# Patient Record
Sex: FEMALE | Race: OTHER | HISPANIC OR LATINO | URBAN - METROPOLITAN AREA
[De-identification: names, ages, dates, MRNs, and addresses within clinical notes are randomized per-mention and may not be internally consistent; named-entity substitution may affect disease eponyms.]

---

## 2017-08-14 ENCOUNTER — EMERGENCY (EMERGENCY)
Facility: HOSPITAL | Age: 32
LOS: 1 days | Discharge: HOME | End: 2017-08-14
Attending: EMERGENCY MEDICINE | Admitting: EMERGENCY MEDICINE
Payer: SELF-PAY

## 2017-08-14 VITALS
RESPIRATION RATE: 16 BRPM | HEART RATE: 71 BPM | TEMPERATURE: 98 F | OXYGEN SATURATION: 99 % | DIASTOLIC BLOOD PRESSURE: 60 MMHG | SYSTOLIC BLOOD PRESSURE: 100 MMHG

## 2017-08-14 VITALS
HEART RATE: 72 BPM | SYSTOLIC BLOOD PRESSURE: 115 MMHG | RESPIRATION RATE: 18 BRPM | TEMPERATURE: 99 F | OXYGEN SATURATION: 99 % | DIASTOLIC BLOOD PRESSURE: 65 MMHG

## 2017-08-14 VITALS
RESPIRATION RATE: 22 BRPM | TEMPERATURE: 99 F | SYSTOLIC BLOOD PRESSURE: 103 MMHG | HEART RATE: 103 BPM | DIASTOLIC BLOOD PRESSURE: 67 MMHG

## 2017-08-14 DIAGNOSIS — Z76.5 MALINGERER [CONSCIOUS SIMULATION]: ICD-10-CM

## 2017-08-14 DIAGNOSIS — F20.9 SCHIZOPHRENIA, UNSPECIFIED: ICD-10-CM

## 2017-08-14 DIAGNOSIS — R10.9 UNSPECIFIED ABDOMINAL PAIN: ICD-10-CM

## 2017-08-14 DIAGNOSIS — F17.200 NICOTINE DEPENDENCE, UNSPECIFIED, UNCOMPLICATED: ICD-10-CM

## 2017-08-14 DIAGNOSIS — R69 ILLNESS, UNSPECIFIED: ICD-10-CM

## 2017-08-14 DIAGNOSIS — F19.94 OTHER PSYCHOACTIVE SUBSTANCE USE, UNSPECIFIED WITH PSYCHOACTIVE SUBSTANCE-INDUCED MOOD DISORDER: ICD-10-CM

## 2017-08-14 LAB
APPEARANCE UR: CLEAR — SIGNIFICANT CHANGE UP
BILIRUB UR-MCNC: (no result)
COLOR SPEC: YELLOW — SIGNIFICANT CHANGE UP
DIFF PNL FLD: (no result)
GLUCOSE UR QL: NEGATIVE — SIGNIFICANT CHANGE UP
HCG UR QL: NEGATIVE — SIGNIFICANT CHANGE UP
KETONES UR-MCNC: 15 MG/DL
LEUKOCYTE ESTERASE UR-ACNC: (no result)
NITRITE UR-MCNC: NEGATIVE — SIGNIFICANT CHANGE UP
PCP SPEC-MCNC: SIGNIFICANT CHANGE UP
PH UR: 5.5 — SIGNIFICANT CHANGE UP (ref 5–8)
PROT UR-MCNC: NEGATIVE MG/DL — SIGNIFICANT CHANGE UP
SP GR SPEC: >=1.03 — SIGNIFICANT CHANGE UP (ref 1–1.03)
UROBILINOGEN FLD QL: 0.2 E.U./DL — SIGNIFICANT CHANGE UP

## 2017-08-14 PROCEDURE — 99053 MED SERV 10PM-8AM 24 HR FAC: CPT

## 2017-08-14 PROCEDURE — 99283 EMERGENCY DEPT VISIT LOW MDM: CPT | Mod: 25

## 2017-08-14 PROCEDURE — 99220: CPT

## 2017-08-14 PROCEDURE — 90792 PSYCH DIAG EVAL W/MED SRVCS: CPT | Mod: GT

## 2017-08-14 NOTE — ED PROVIDER NOTE - OBJECTIVE STATEMENT
31 F reports foot pain at triage, when I attempt to take history from patient she is covered with sheet sleeping in chair, then begins yelling at me and cursing at me when I try to remove sheet, she states she will not let me examen her and tells me to give her "the fucking discharge papers." Noted to be ambulating without difficulty

## 2017-08-14 NOTE — ED CDU PROVIDER NOTE - PROGRESS NOTE DETAILS
discussed with telepsych who will eval patient pt uncooperative with psych - no suicidal plan and cleared by psych, and suspected to be malingering, stable for dc home

## 2017-08-14 NOTE — ED BEHAVIORAL HEALTH ASSESSMENT NOTE - SUMMARY
Patient is a 30 y/o single, undomiciled, unemployed female with h/o untreated schizophrenia, bib self for abdominal pain, refused blood work, gave urine - positive for THC and Cocaine, slept when she came in.  She was irritable during interview, guarded and did not want to provide me with any information.  She did not appear psychotic and did not respond to internal stimuli during the interview.  She refused to tell me what happened she came from [place] 14 years ago.  She denies she has any family.  She denied drug use and said she occasionally drinks etoh.  She said she is sometimes suicidal but has no plan or intent.  She said she had an attempt 14 years ago by taking pills but would not give me any information.  She denies ever taking any medication for psychosis.  It is unclear if this is drug induced psychosis.  She then refused to give me any more information.

## 2017-08-14 NOTE — ED BEHAVIORAL HEALTH ASSESSMENT NOTE - AXIS IV
Educational problems/Occupational problems/Problems with primary support/Economic problems/Housing problems

## 2017-08-14 NOTE — ED ADULT NURSE REASSESSMENT NOTE - NS ED NURSE REASSESS COMMENT FT1
Pt on 1:1 constant observation for psych evaluation. Pt instructed to take off clothing and dress in hospital scrubs. Pt refusing to remove t-shirt, stating "I say no." Explained hospital protocol and safety issue to patient who continued to say "I say no." Dr. Padilla made aware who stated pt could remain in t-shirt at this time until psych evaluation and recommendations.

## 2017-08-14 NOTE — ED PROVIDER NOTE - MEDICAL DECISION MAKING DETAILS
Pt likely malingering for place to sleep, no distress and normal gait, refusing to cooperate with history or physical, disruptive, will discharge with return precautions and follow up with PMD as needed

## 2017-08-14 NOTE — ED CDU PROVIDER NOTE - OBJECTIVE STATEMENT
pt reports command auditory hallucinations telling her to kill herself x months, she is feeling suicidal, wishes she could die, no plan, +h/o 2 suicide attempts by overdose in past

## 2017-08-14 NOTE — ED ADULT TRIAGE NOTE - CHIEF COMPLAINT QUOTE
Ambulates into ED complaining of spine and abdominal pain in conjunction with hallucinations and dementia.  Appears manic at triage as evidenced by pressured speech and disorganized  thought processes.

## 2017-08-14 NOTE — ED ADULT TRIAGE NOTE - CHIEF COMPLAINT QUOTE
Pt BIBA from [address] complaining of foot pain. Pt has a sheet over her head and sleeping at triage

## 2017-08-14 NOTE — ED BEHAVIORAL HEALTH ASSESSMENT NOTE - RISK ASSESSMENT
pt. had one suicide attempt 14 years ago, guarded historian with irritability, no overt psychotic symptoms observed.  No records in system.  Patient seems to be med seeking and uncooperative with interview.  She refused to speak after 10 minutes.  Patient says she has psychosis but no treatement ever as per pt.  So could be drug related.  Patient said she did not use drugs - so not an accurate historian.  No collateral available. As pt. is uncooperative with interview and blood work - I cannot make any recommendations.  She does not seem to be a danger to self currently but difficult to make assessment due to being uncooperative and having no collateral.

## 2017-08-14 NOTE — ED PROVIDER NOTE - OBJECTIVE STATEMENT
pt reports auditory hallucinations suicidal, wishes she could die pt reports command auditory hallucinations telling her to kill herself x months, she is feeling suicidal, wishes she could die, no plan, +h/o 2 suicide attempts by overdose in past

## 2017-08-14 NOTE — ED BEHAVIORAL HEALTH ASSESSMENT NOTE - HPI (INCLUDE ILLNESS QUALITY, SEVERITY, DURATION, TIMING, CONTEXT, MODIFYING FACTORS, ASSOCIATED SIGNS AND SYMPTOMS)
Patient is a 32 y/o single, undomiciled, unemployed female with h/o untreated schizophrenia, bib self for abdominal pain, refused blood work, gave urine - positive for THC and Cocaine, slept when she came in.  She was irritable during interview, guarded and did not want to provide me with any information.  She did not appear psychotic and did not respond to internal stimuli during the interview.  She refused to tell me what happened she came from [place] 14 years ago.  She denies she has any family.  She denied drug use and said she occasionally drinks etoh.  She said she is sometimes suicidal but has no plan or intent.  She said she had an attempt 14 years ago by taking pills but would not give me any information.  She denies ever taking any medication for psychosis.  It is unclear if this is drug induced psychosis.  She then refused to give me any more information.

## 2017-08-18 DIAGNOSIS — M79.673 PAIN IN UNSPECIFIED FOOT: ICD-10-CM

## 2017-08-18 DIAGNOSIS — Z76.5 MALINGERER [CONSCIOUS SIMULATION]: ICD-10-CM

## 2018-02-22 NOTE — ED BEHAVIORAL HEALTH ASSESSMENT NOTE - NS ED BHA TELEPSYCH PATIENT INTERVIEW PRIVATE SPACE
New prescription sent to [facility] on [address]. Called to pharmacy and informed them of medication change.   They have updated their system
[facility] was calling with a question about a prescription that was sent over. It is not covered by insurance. Pharmacy was wondering what alternative to use.  Please call back
Patient interviewed in a private space.

## 2019-06-21 NOTE — ED BEHAVIORAL HEALTH ASSESSMENT NOTE - ORIENTED TO PERSON
Problem: Safety  Goal: Will remain free from injury  Outcome: PROGRESSING AS EXPECTED    Goal: Will remain free from falls  Outcome: PROGRESSING AS EXPECTED         Yes

## 2020-02-13 NOTE — ED PROVIDER NOTE - HEME LYMPH, MLM
Abdomen soft, non-tender, no rebound, no guarding. No adenopathy or splenomegaly. No cervical or inguinal lymphadenopathy.

## 2020-06-26 NOTE — ED ADULT NURSE NOTE - NS ED NURSE LEVEL OF CONSCIOUSNESS SPEECH
Patient is on Ozempic 1 mg which comes with needles so she does not need separate order. I called pharmacy and the agreed. I told them not to fill and I will let patient know. Speaking Coherently

## 2021-05-26 NOTE — ED BEHAVIORAL HEALTH ASSESSMENT NOTE - DESCRIPTION
Psychiatric rehabilitation staff approached patient to orient her to psychiatric rehabilitation staff and services. Patient was  receptive to psychiatric rehabilitation staff engagement and was able to collaborate with psychiatric rehabilitation staff and choose a psychiatric rehabilitation goal.  Patient was observed as verbal and cooperative during assessment with writer.  Patient reported an "okay" mood and was observed with a slightly constricted affect during initial assessment.  Patient denied suicidal ideation/intent/plan.  Patient denied auditory and visual hallucinations.  Patient was able to identify reason for admission and was oriented x3.  Patient was observed with fair appearance/hygiene.  Patient demonstrated good impulse control and maintained poor/fair/appropriate eye contact throughout the interview. Patient spoke in a low tone of voice and at a normal rate.  Patient’s thought process is assessed as linear. Patient is assessed with fair insight and judgement.  Patient was admitted to [facility] due to suicidal ideation, however patient denied suicidal ideation/intent/plan as well as self-injurious behavior.    Psychiatric Rehabilitation staff will continuously engage patient in order to build on a therapeutic rapport and to assist in achieving the following goals during the next 7 days: identifying 1-2 healthy and effective coping skills as well as attending daily psycho/education groups for improved symptom management.   Patient was observed wearing a mask in response to the COVID-19 pandemic. uncooperative, did not require medications none known [place] moved here at age 17 - unclear hx after that

## 2022-04-29 NOTE — ED PROVIDER NOTE - CROS ED ROS STATEMENT
Learning About Obesity  What is obesity? Obesity means having an unhealthy amount of body fat. This puts your health in danger. It can lead to other health problems, such as type 2 diabetes and highblood pressure. How do you know if your weight is in the obesity range? To know if your weight is in the obesity range, your doctor looks at your bodymass index (BMI) and waist size. BMI is a number that is calculated from your weight and your height. To figure out your BMI for yourself, you can use an online tool, such as http://www.MultiZona.com/ on [employer]. If your BMI is 30.0 or higher, it falls within the obesity range. Keep in mind that BMI and waist size are only guides. They are not tools to determine yourideal body weight. What causes obesity? When you take in more calories than you burn off, you gain weight. How you eat, how active you are, and other things affect how your body uses calories andwhether you gain weight. If you have family members who have too much body fat, you may have inherited a tendency to gain weight. And your family also helps form your eating andlifestyle habits, which can lead to obesity. Also, our busy lives make it harder to plan and cook healthy meals. For many of us, it's easier to reach for prepared foods, go out to eat, or go to the drive-through. But these foods are often high in saturated fat and calories. Portions are often too large. What can you do to reach a healthy weight? Focus on health, not diets. Diets are hard to stay on and don't work in the long run. It is very hard tostay with a diet that includes lots of big changes in your eating habits. Instead of a diet, focus on lifestyle changes that will improve your health and achieve the right balance of energy and calories. To lose weight, you need to burn more calories than you take in.  You can do it by eating healthy foods in reasonable amounts and becoming more active, even a little bit every day. Making small changes over time can add up to a lot. Make a plan for change. Many people have found that naming their reasons for change and staying focused on their plan can make a big difference. Work with your doctor tocreate a plan that is right for you.  Ask yourself: Mak Cortez are my personal, most powerful reasons for wanting this change? What will my life look like when I've made the change? \"   Set your long-term goal. Make it specific, such as \"I will lose x pounds. \"  [employer] your long-term goal into smaller, short-term goals. Make these small steps specific and within your reach, things you know you can do. These steps are what keep you going from day to day. Talk with your doctor about other weight-loss options. If you have a BMI in a certain range and have not been able to lose weight with diet and exercise, medicine or surgery may be an option for you. Before your doctor will prescribe medicines or surgery, he or she will probably want you to be more active and follow your healthy eating plan for a period of time. These habits are key lifelong changes for managing your weight, with or without other medical treatment. And these changes can help you avoid weight-relatedhealth problems. How can you stay on your plan for change? Be ready. Choose to start during a time when there are few events like holidays, socialevents, and high-stress periods. These events might trigger slip-ups. Decide on your first few steps. Most people have more success when they make small changes, one step at a time. For example, you might switch a daily candy bar to a piece of fruit, walk10 minutes more, or add more vegetables to a meal.  Line up your support people. Make sure you're not going to be alone as you make this change. Connect with people who understand how important it is to you.  Ask family members and friends for help in keeping with your plan. And think about who could make itharder for you, and how to handle them. Try tracking. People who keep track of what they eat, feel, and do are better at losingweight. Try writing down things like:   What and how much you eat.  How you feel before and after each meal.   Details about each meal (like eating out or at home, eating alone, or with friends or family).  What you do to be active. Look and plan. As you track, look for patterns that you may want to change. Take note of:   When you eat and whether you skip meals.  How often you eat out.  How many fruits and vegetables you eat.  When you eat beyond feeling full.  When and why you eat for reasons other than being hungry. When you stray from your plan, don't get upset. Figure out what made you slipup and how you can fix it. Can you take medicines or have surgery to lose weight? If you have a BMI in a certain range and have not been able to lose weight withdiet and exercise, medicine or surgery may be an option for you. If you have a BMI of at least 30.0 (or a BMI of at least 27.0 and another health problem related to your weight), ask your doctor about weight-loss medicines. They work by making you feel less hungry, making you feel full more quickly, or changing how you digest fat. Medicines are used along with dietchanges and more physical activity to help you make lasting changes. If you have a BMI of 40.0 or more (or a BMI of 35.0 or more and another health problem related to your weight), your doctor may talk with you about surgery. Weight-loss surgery has risks, and you will need to work with your doctor tocompare the risk of having obesity with the risks of surgery. With any option you choose, you will still need to eat a healthy diet and getregular exercise. Follow-up care is a key part of your treatment and safety. Be sure to make and go to all appointments, and call your doctor if you are having problems.  It's also a good idea to know your test results and keep alist of the medicines you take. Where can you learn more? Go to [email or web address] and sign in to your [employer] account. Enter N111 in the [facility] box to learn more about \"Learning About Obesity. \"     If you do not have an account, please click on the \"Sign Up Now\" link. Current as of: December 27, 2021               Content Version: 13.2  © 3115-6203 [employer]wise, Incorporated. Care instructions adapted under license by [facility] ([facility]). If you have questions about a medical condition or this instruction, always ask your healthcare professional. [address] any warranty or liability for your use of this information. all other ROS negative except as per HPI

## 2022-11-22 NOTE — ED ADULT NURSE NOTE - NS ED NURSE LEVEL OF CONSCIOUSNESS MENTAL STATUS
- annual eye exam for HCQ toxicity monitoring.  - d/w pt that MTX increases the risk of infections, birth defects, liver toxicity, rare lung problems, probable malignancy and bone marrow toxicity. Discussed need to check safety labs 4 weeks after starting MTX followed by labs every 3 months once we reach a steady dose. Strongly recommended alcohol abstinence. She will obtain labs in 4 wks time after increasing MTX dose. Cooperative/Awake

## 2024-10-14 NOTE — ED CDU PROVIDER NOTE - NS ED MD DISPO DISCHARGE CCDA
loss of insurance, patient has been off her medications for the last 6 months and says that her symptoms have been worse since that time.  EKG obtained today in clinic shows sinus rhythm with slow R wave progression in precordial leads, but otherwise no significant ischemic changes.  2. Lightheadedness/syncope - Possible that had vasovagal reaction, but a possibly underlying arrhythmia cannot be definitely excluded at this time.  Orthostatic vitals were negative today in clinic.  3. CAD - S/p NSTEMI in 5/2021.  Invasive angiography at that time demonstrated severe small branch vessel coronary artery disease involving the distal right posterior descending artery, mid-second right posterolateral branch, and distal third right posterolateral branch in vessel segments that measured <1.5 mm in diameter and were not amenable to percutaneous coronary intervention.  The OM1 was also a relatively small caliber vessel with a high origin and had an ostial 60-70% stenosis that was not a good target for percutaneous coronary intervention either.  Additionally, there was noted to be a short myocardial bridge in the distal LAD in a portion of the vessel that had mild-moderate atherosclerotic disease.  4. Ischemic cardiomyopathy - Last TTE from 5/25/21 showed an LVEF of 50-55% with mild inferior hypokinesis.  Currently appears to be near a clinically euvolemic state.   5. Essential hypertension - BP currently controlled.  6. Hyperlipidemia  7. Type II DM  8. Obesity  9. Tobacco use       Plan:      1. Can resume aspirin 81 mg daily, atorvastatin 80 mg qHS, metoprolol succinate 50 mg daily, lisinopril 2.5 mg daily, and imdur 30 mg once daily which should be available in generic form.  2. Can remain off Plavix.  3. Will arrange for 2-week cardiac event monitor to further evaluate for potential arrhythmias that may have contributed to patient's syncope.  4. Will order TTE for complete assessment of cardiac structure and function and  Patient/Caregiver provided printed discharge information.

## 2025-07-16 NOTE — ED CDU PROVIDER NOTE - OBSERVATION MONITORING PLAN
Voiding Trial     180mL of NaCl instilled at 0910.     Pt voided 180mL at 0915, Dr. Boyce notified.    ED Record Reviewed